# Patient Record
Sex: MALE | Race: WHITE | NOT HISPANIC OR LATINO | URBAN - METROPOLITAN AREA
[De-identification: names, ages, dates, MRNs, and addresses within clinical notes are randomized per-mention and may not be internally consistent; named-entity substitution may affect disease eponyms.]

---

## 2020-12-22 ENCOUNTER — OFFICE VISIT (OUTPATIENT)
Dept: ENDOCRINOLOGY | Facility: CLINIC | Age: 20
End: 2020-12-22

## 2020-12-22 VITALS
RESPIRATION RATE: 15 BRPM | WEIGHT: 189 LBS | BODY MASS INDEX: 27.06 KG/M2 | HEART RATE: 90 BPM | OXYGEN SATURATION: 99 % | HEIGHT: 70 IN | SYSTOLIC BLOOD PRESSURE: 110 MMHG | DIASTOLIC BLOOD PRESSURE: 70 MMHG

## 2020-12-22 DIAGNOSIS — E05.00 GRAVES' DISEASE: Primary | ICD-10-CM

## 2020-12-22 PROCEDURE — 99204 OFFICE O/P NEW MOD 45 MIN: CPT | Performed by: INTERNAL MEDICINE

## 2020-12-22 RX ORDER — MELOXICAM 15 MG/1
15 TABLET ORAL 2 TIMES DAILY
COMMUNITY
End: 2021-04-01

## 2020-12-22 RX ORDER — METHIMAZOLE 10 MG/1
20 TABLET ORAL DAILY
Qty: 60 TABLET | Refills: 11 | Status: SHIPPED | OUTPATIENT
Start: 2020-12-22 | End: 2021-04-01

## 2020-12-22 RX ORDER — PROPYLTHIOURACIL 50 MG/1
TABLET ORAL 3 TIMES DAILY
COMMUNITY
End: 2020-12-22

## 2020-12-22 RX ORDER — ATENOLOL 50 MG/1
50 TABLET ORAL DAILY
Qty: 30 TABLET | Refills: 11 | Status: SHIPPED | OUTPATIENT
Start: 2020-12-22 | End: 2021-12-06

## 2020-12-22 NOTE — PROGRESS NOTES
"     Office Note      Date: 2020  Patient Name: Gildardo Eid  MRN: [id number]  : 2000    Chief Complaint   Patient presents with   • Thyroid Problem       History of Present Illness:   Gildardo Eid is a 20 y.o. male who presents for Thyroid Problem  he is seen as a new pt today.  Duration- he noted onset of symptoms 2 years ago. Shakiness in hands and legs. Heat intolerance, nervousness and insomnia.  Wt loss of 40 pounds.  Severity-8/10  Modifying factors- he was put on ptu but it has not helped.  Context- review of records shows labs consistent with hyperthyroidism. He has maternal aunts with Graves' disese.    Subjective      }.    Review of Systems:   Review of Systems   Constitutional: Positive for fatigue and unexpected weight change.   HENT: Negative.    Eyes: Negative.    Respiratory: Negative.    Cardiovascular: Positive for palpitations.   Gastrointestinal: Negative.    Endocrine: Positive for heat intolerance.   Genitourinary: Negative.    Musculoskeletal: Negative.    Skin: Negative.    Neurological: Positive for tremors.   Hematological: Negative.    Psychiatric/Behavioral: Positive for agitation and sleep disturbance. The patient is nervous/anxious and is hyperactive.        The following portions of the patient's history were reviewed and updated as appropriate: allergies, current medications, past family history, past medical history, past social history, past surgical history and problem list.    Objective     Visit Vitals  /70   Pulse 90   Resp 15   Ht 177.8 cm (70\")   Wt 85.7 kg (189 lb)   SpO2 99%   BMI 27.12 kg/m²       Labs:    CBC w/DIFF  No results found for: WBC, RBC, HGB, HCT, MCV, MCH, MCHC, RDW, RDWSD, MPV, PLT, NEUTRORELPCT, LYMPHORELPCT, MONORELPCT, EOSRELPCT, BASORELPCT, AUTOIGPER, NEUTROABS, LYMPHSABS, MONOSABS, EOSABS, BASOSABS, AUTOIGNUM, NRBC    T4  No results found for: FREET4    TSH  No results found for: TSHBASE     Physical Exam:  Physical " Exam  Vitals signs reviewed.   Constitutional:       Appearance: Normal appearance. He is normal weight.   HENT:      Head: Normocephalic and atraumatic.      Mouth/Throat:      Mouth: Mucous membranes are moist.      Pharynx: Oropharynx is clear.   Eyes:      Extraocular Movements: Extraocular movements intact.      Pupils: Pupils are equal, round, and reactive to light.      Comments: No bulging    Neck:      Musculoskeletal: Normal range of motion and neck supple.      Comments: Diffuse, non-tender goiter  Cardiovascular:      Rate and Rhythm: Normal rate and regular rhythm.      Pulses: Normal pulses.      Heart sounds: Normal heart sounds.   Musculoskeletal: Normal range of motion.         General: No swelling.   Skin:     General: Skin is warm and dry.      Coloration: Skin is not jaundiced.      Findings: No bruising or lesion.   Neurological:      General: No focal deficit present.      Mental Status: He is alert and oriented to person, place, and time.      Deep Tendon Reflexes: Reflexes abnormal.   Psychiatric:         Mood and Affect: Mood normal.         Thought Content: Thought content normal.         Judgment: Judgment normal.         Assessment / Plan      Assessment & Plan:  Problem List Items Addressed This Visit        Endocrine    Graves' disease - Primary    Current Assessment & Plan     A new problem requiring treatment.  Will switch to tapazole for increased effectiveness and add a betablocker  Side effects and expected course of treatment were discussed.  This is not a toxic multinodular goiter based upon physical exam and not thyroiditis due to time course            Relevant Medications    meloxicam (MOBIC) 15 MG tablet    methIMAzole (TAPAZOLE) 10 MG tablet    atenolol (Tenormin) 50 MG tablet           John Tirado MD   12/22/2020

## 2020-12-22 NOTE — ASSESSMENT & PLAN NOTE
A new problem requiring treatment.  Will switch to tapazole for increased effectiveness and add a betablocker  Side effects and expected course of treatment were discussed.  This is not a toxic multinodular goiter based upon physical exam and not thyroiditis due to time course

## 2021-04-01 ENCOUNTER — LAB (OUTPATIENT)
Dept: LAB | Facility: HOSPITAL | Age: 21
End: 2021-04-01

## 2021-04-01 ENCOUNTER — OFFICE VISIT (OUTPATIENT)
Dept: ENDOCRINOLOGY | Facility: CLINIC | Age: 21
End: 2021-04-01

## 2021-04-01 VITALS
HEART RATE: 60 BPM | DIASTOLIC BLOOD PRESSURE: 74 MMHG | WEIGHT: 214 LBS | OXYGEN SATURATION: 99 % | SYSTOLIC BLOOD PRESSURE: 126 MMHG | TEMPERATURE: 97.7 F | BODY MASS INDEX: 30.64 KG/M2 | HEIGHT: 70 IN

## 2021-04-01 DIAGNOSIS — E05.00 GRAVES' DISEASE: Primary | ICD-10-CM

## 2021-04-01 DIAGNOSIS — E05.00 GRAVES' DISEASE: ICD-10-CM

## 2021-04-01 LAB
T4 FREE SERPL-MCNC: 0.95 NG/DL (ref 0.93–1.7)
TSH SERPL DL<=0.05 MIU/L-ACNC: 0.01 UIU/ML (ref 0.27–4.2)

## 2021-04-01 PROCEDURE — 84439 ASSAY OF FREE THYROXINE: CPT

## 2021-04-01 PROCEDURE — 84443 ASSAY THYROID STIM HORMONE: CPT

## 2021-04-01 PROCEDURE — 99213 OFFICE O/P EST LOW 20 MIN: CPT | Performed by: INTERNAL MEDICINE

## 2021-04-01 RX ORDER — METHIMAZOLE 10 MG/1
10 TABLET ORAL DAILY
Qty: 60 TABLET | Refills: 11 | Status: SHIPPED | OUTPATIENT
Start: 2021-04-01 | End: 2021-08-26 | Stop reason: SDUPTHER

## 2021-04-01 RX ORDER — IBUPROFEN 800 MG/1
TABLET ORAL
COMMUNITY
Start: 2021-03-18

## 2021-04-01 NOTE — PROGRESS NOTES
"     Office Note      Date: 2021  Patient Name: Gildardo Eid  MRN: [id number]  : 2000    Chief Complaint   Patient presents with   • Thyroid Problem       History of Present Illness:   Gildardo Eid is a 20 y.o. male who presents for Thyroid Problem  I have him on 20 mg per day of methimazole and atenolol  He notes wt gain. The tremors and rapid heart beat and heat intolerance are gone.  He notes no fatigue. No constipation. Some insomnia.  No neck swelling.  No ocular symptoms     Subjective        Review of Systems:   Review of Systems   Constitutional: Negative.    HENT: Negative.    Eyes: Negative.    Respiratory: Negative.    All other systems reviewed and are negative.      The following portions of the patient's history were reviewed and updated as appropriate: allergies, current medications, past family history, past medical history, past social history, past surgical history and problem list.    Objective     Visit Vitals  /74 (BP Location: Left arm, Patient Position: Sitting, Cuff Size: Adult)   Pulse 60   Temp 97.7 °F (36.5 °C) (Infrared)   Ht 177.8 cm (70\")   Wt 97.1 kg (214 lb)   SpO2 99%   BMI 30.71 kg/m²           Physical Exam:  Physical Exam  Vitals reviewed.   Constitutional:       Appearance: Normal appearance. He is normal weight.   HENT:      Head: Normocephalic and atraumatic.   Eyes:      Extraocular Movements: Extraocular movements intact.   Neck:      Comments: goiter  Lymphadenopathy:      Cervical: No cervical adenopathy.   Neurological:      Mental Status: He is alert.   Psychiatric:         Mood and Affect: Mood normal.         Thought Content: Thought content normal.         Judgment: Judgment normal.         Assessment / Plan      Assessment & Plan:  Problem List Items Addressed This Visit        Other    Graves' disease - Primary    Current Assessment & Plan     Clinically much improved. And a probably a little hypothyroid. Will check labs but have him " reduce methimazole for now          Relevant Medications    atenolol (Tenormin) 50 MG tablet    ibuprofen (ADVIL,MOTRIN) 800 MG tablet    methIMAzole (TAPAZOLE) 10 MG tablet           John Tirado MD   04/01/2021

## 2021-04-01 NOTE — ASSESSMENT & PLAN NOTE
Clinically much improved. And a probably a little hypothyroid. Will check labs but have him reduce methimazole for now

## 2021-08-26 ENCOUNTER — OFFICE VISIT (OUTPATIENT)
Dept: ENDOCRINOLOGY | Facility: CLINIC | Age: 21
End: 2021-08-26

## 2021-08-26 ENCOUNTER — LAB (OUTPATIENT)
Dept: LAB | Facility: HOSPITAL | Age: 21
End: 2021-08-26

## 2021-08-26 VITALS
SYSTOLIC BLOOD PRESSURE: 116 MMHG | HEIGHT: 70 IN | BODY MASS INDEX: 31.78 KG/M2 | DIASTOLIC BLOOD PRESSURE: 70 MMHG | HEART RATE: 50 BPM | WEIGHT: 222 LBS | OXYGEN SATURATION: 100 %

## 2021-08-26 DIAGNOSIS — T63.331A BROWN RECLUSE SPIDER BITE OR STING, ACCIDENTAL OR UNINTENTIONAL, INITIAL ENCOUNTER: ICD-10-CM

## 2021-08-26 DIAGNOSIS — E05.00 GRAVES' DISEASE: ICD-10-CM

## 2021-08-26 DIAGNOSIS — E05.00 GRAVES' DISEASE: Primary | ICD-10-CM

## 2021-08-26 LAB
ALBUMIN SERPL-MCNC: 4.7 G/DL (ref 3.5–5.2)
ALBUMIN/GLOB SERPL: 1.8 G/DL
ALP SERPL-CCNC: 159 U/L (ref 39–117)
ALT SERPL W P-5'-P-CCNC: 15 U/L (ref 1–41)
ANION GAP SERPL CALCULATED.3IONS-SCNC: 8.7 MMOL/L (ref 5–15)
AST SERPL-CCNC: 13 U/L (ref 1–40)
BASOPHILS # BLD AUTO: 0.1 10*3/MM3 (ref 0–0.2)
BASOPHILS NFR BLD AUTO: 1.2 % (ref 0–1.5)
BILIRUB SERPL-MCNC: 0.4 MG/DL (ref 0–1.2)
BUN SERPL-MCNC: 14 MG/DL (ref 6–20)
BUN/CREAT SERPL: 15.6 (ref 7–25)
CALCIUM SPEC-SCNC: 9.3 MG/DL (ref 8.6–10.5)
CHLORIDE SERPL-SCNC: 104 MMOL/L (ref 98–107)
CO2 SERPL-SCNC: 28.3 MMOL/L (ref 22–29)
CREAT SERPL-MCNC: 0.9 MG/DL (ref 0.76–1.27)
DEPRECATED RDW RBC AUTO: 38.7 FL (ref 37–54)
EOSINOPHIL # BLD AUTO: 1.25 10*3/MM3 (ref 0–0.4)
EOSINOPHIL NFR BLD AUTO: 15.3 % (ref 0.3–6.2)
ERYTHROCYTE [DISTWIDTH] IN BLOOD BY AUTOMATED COUNT: 11.7 % (ref 12.3–15.4)
GFR SERPL CREATININE-BSD FRML MDRD: 105 ML/MIN/1.73
GLOBULIN UR ELPH-MCNC: 2.6 GM/DL
GLUCOSE SERPL-MCNC: 86 MG/DL (ref 65–99)
HCT VFR BLD AUTO: 43.3 % (ref 37.5–51)
HGB BLD-MCNC: 14.1 G/DL (ref 13–17.7)
IMM GRANULOCYTES # BLD AUTO: 0.03 10*3/MM3 (ref 0–0.05)
IMM GRANULOCYTES NFR BLD AUTO: 0.4 % (ref 0–0.5)
LYMPHOCYTES # BLD AUTO: 2.09 10*3/MM3 (ref 0.7–3.1)
LYMPHOCYTES NFR BLD AUTO: 25.5 % (ref 19.6–45.3)
MCH RBC QN AUTO: 29.3 PG (ref 26.6–33)
MCHC RBC AUTO-ENTMCNC: 32.6 G/DL (ref 31.5–35.7)
MCV RBC AUTO: 90 FL (ref 79–97)
MONOCYTES # BLD AUTO: 0.46 10*3/MM3 (ref 0.1–0.9)
MONOCYTES NFR BLD AUTO: 5.6 % (ref 5–12)
NEUTROPHILS NFR BLD AUTO: 4.26 10*3/MM3 (ref 1.7–7)
NEUTROPHILS NFR BLD AUTO: 52 % (ref 42.7–76)
NRBC BLD AUTO-RTO: 0 /100 WBC (ref 0–0.2)
PLATELET # BLD AUTO: 211 10*3/MM3 (ref 140–450)
PMV BLD AUTO: 11.9 FL (ref 6–12)
POTASSIUM SERPL-SCNC: 3.8 MMOL/L (ref 3.5–5.2)
PROT SERPL-MCNC: 7.3 G/DL (ref 6–8.5)
RBC # BLD AUTO: 4.81 10*6/MM3 (ref 4.14–5.8)
SODIUM SERPL-SCNC: 141 MMOL/L (ref 136–145)
T4 FREE SERPL-MCNC: 1 NG/DL (ref 0.93–1.7)
TSH SERPL DL<=0.05 MIU/L-ACNC: 1.32 UIU/ML (ref 0.27–4.2)
WBC # BLD AUTO: 8.19 10*3/MM3 (ref 3.4–10.8)

## 2021-08-26 PROCEDURE — 80053 COMPREHEN METABOLIC PANEL: CPT

## 2021-08-26 PROCEDURE — 99214 OFFICE O/P EST MOD 30 MIN: CPT | Performed by: INTERNAL MEDICINE

## 2021-08-26 PROCEDURE — 36415 COLL VENOUS BLD VENIPUNCTURE: CPT

## 2021-08-26 PROCEDURE — 84439 ASSAY OF FREE THYROXINE: CPT

## 2021-08-26 PROCEDURE — 84443 ASSAY THYROID STIM HORMONE: CPT

## 2021-08-26 PROCEDURE — 85025 COMPLETE CBC W/AUTO DIFF WBC: CPT

## 2021-08-26 RX ORDER — SULFAMETHOXAZOLE AND TRIMETHOPRIM 400; 80 MG/1; MG/1
1 TABLET ORAL 2 TIMES DAILY
Qty: 20 TABLET | Refills: 0 | Status: SHIPPED | OUTPATIENT
Start: 2021-08-26 | End: 2023-01-18

## 2021-08-26 RX ORDER — METHIMAZOLE 10 MG/1
10 TABLET ORAL DAILY
Qty: 60 TABLET | Refills: 11 | Status: SHIPPED | OUTPATIENT
Start: 2021-08-26 | End: 2021-12-23

## 2021-08-26 RX ORDER — TERBINAFINE HYDROCHLORIDE 1 G/100G
CREAM TOPICAL 2 TIMES DAILY
COMMUNITY
Start: 2021-06-01

## 2021-08-26 NOTE — PROGRESS NOTES
"     Office Note      Date: 2021  Patient Name: Gildardo Eid  MRN: [id number]  : 2000    Cc: graves disease   History of Present Illness:   Gildardo Eid is a 21 y.o. male who presents for follow up of Graves' disease. He takes methimazole 10 mg per day.  He is up 8 pounds.  ---  He had to go to the ER for a spider bit. He was treated with abx for a spider bite.  (bactrim) he got another spider bite on the other foot yesterday.  ---  He notes no changes to his vision  He has gained wt.  No temp intol  No insomnia.  No skin or hair changed.  No neck changes.   No heart palpitations   Not unusual fatigue       Subjective        Review of Systems:   Review of Systems   Constitutional: Positive for unexpected weight change. Negative for fatigue.   HENT: Negative for trouble swallowing and voice change.    Eyes: Negative for visual disturbance.   Skin: Positive for wound.   Neurological: Negative for tremors.   Psychiatric/Behavioral: Negative for sleep disturbance.       The following portions of the patient's history were reviewed and updated as appropriate: allergies, current medications, past family history, past medical history, past social history, past surgical history and problem list.    Objective     Visit Vitals  /70 (BP Location: Left arm, Patient Position: Sitting, Cuff Size: Adult)   Pulse 50   Ht 177.8 cm (70\")   Wt 101 kg (222 lb)   SpO2 100%   BMI 31.85 kg/m²       Labs:    CBC w/DIFF  No results found for: WBC, RBC, HGB, HCT, MCV, MCH, MCHC, RDW, RDWSD, MPV, PLT, NEUTRORELPCT, LYMPHORELPCT, MONORELPCT, EOSRELPCT, BASORELPCT, AUTOIGPER, NEUTROABS, LYMPHSABS, MONOSABS, EOSABS, BASOSABS, AUTOIGNUM, NRBC    T4  Free T4   Date Value Ref Range Status   2021 0.95 0.93 - 1.70 ng/dL Final       TSH  No results found for: TSHBASE     Physical Exam:  Physical Exam  Vitals reviewed.   Constitutional:       Appearance: Normal appearance.   Eyes:      Extraocular Movements: " Extraocular movements intact.      Pupils: Pupils are equal, round, and reactive to light.   Neck:      Comments: Thyroid remains slightly larger than a normal gland   Cardiovascular:      Rate and Rhythm: Normal rate and regular rhythm.   Lymphadenopathy:      Cervical: No cervical adenopathy.   Skin:     Comments: Spider bite right foot    Neurological:      Mental Status: He is alert.   Psychiatric:         Mood and Affect: Mood normal.         Thought Content: Thought content normal.         Judgment: Judgment normal.         Assessment / Plan      Assessment & Plan:  Problem List Items Addressed This Visit        Other    Graves' disease - Primary    Current Assessment & Plan     Clinically euthyroid. Thyroid levels ordered. Medication to be adjusted accordingly.         Relevant Medications    atenolol (Tenormin) 50 MG tablet    ibuprofen (ADVIL,MOTRIN) 800 MG tablet    methIMAzole (TAPAZOLE) 10 MG tablet    Other Relevant Orders    Comprehensive Metabolic Panel    TSH    T4, Free    CBC & Differential    Brown recluse spider bite    Overview     Right foot          Current Assessment & Plan     Will treat with abx         Relevant Medications    sulfamethoxazole-trimethoprim (Bactrim) 400-80 MG tablet    Other Relevant Orders    CBC & Differential           John Tirado MD   08/26/2021

## 2021-12-05 DIAGNOSIS — E05.00 GRAVES' DISEASE: ICD-10-CM

## 2021-12-06 RX ORDER — ATENOLOL 50 MG/1
50 TABLET ORAL DAILY
Qty: 30 TABLET | Refills: 2 | Status: SHIPPED | OUTPATIENT
Start: 2021-12-06 | End: 2022-03-07

## 2021-12-23 ENCOUNTER — LAB (OUTPATIENT)
Dept: LAB | Facility: HOSPITAL | Age: 21
End: 2021-12-23

## 2021-12-23 ENCOUNTER — OFFICE VISIT (OUTPATIENT)
Dept: ENDOCRINOLOGY | Facility: CLINIC | Age: 21
End: 2021-12-23

## 2021-12-23 VITALS
DIASTOLIC BLOOD PRESSURE: 72 MMHG | BODY MASS INDEX: 33.79 KG/M2 | OXYGEN SATURATION: 97 % | SYSTOLIC BLOOD PRESSURE: 118 MMHG | HEIGHT: 70 IN | HEART RATE: 62 BPM | WEIGHT: 236 LBS

## 2021-12-23 DIAGNOSIS — E05.00 GRAVES' DISEASE: ICD-10-CM

## 2021-12-23 PROBLEM — T63.331A BROWN RECLUSE SPIDER BITE: Status: RESOLVED | Noted: 2021-08-26 | Resolved: 2021-12-23

## 2021-12-23 LAB
T4 FREE SERPL-MCNC: 1.27 NG/DL (ref 0.93–1.7)
TSH SERPL DL<=0.05 MIU/L-ACNC: 1.49 UIU/ML (ref 0.27–4.2)

## 2021-12-23 PROCEDURE — 84439 ASSAY OF FREE THYROXINE: CPT

## 2021-12-23 PROCEDURE — 99213 OFFICE O/P EST LOW 20 MIN: CPT | Performed by: INTERNAL MEDICINE

## 2021-12-23 PROCEDURE — 84443 ASSAY THYROID STIM HORMONE: CPT

## 2021-12-23 RX ORDER — CETIRIZINE HYDROCHLORIDE 10 MG/1
TABLET ORAL
COMMUNITY
Start: 2021-12-17

## 2021-12-23 RX ORDER — METHIMAZOLE 10 MG/1
5 TABLET ORAL DAILY
Qty: 60 TABLET | Refills: 11 | Status: SHIPPED | OUTPATIENT
Start: 2021-12-23 | End: 2021-12-28

## 2021-12-23 NOTE — PROGRESS NOTES
"     Office Note      Date: 2021  Patient Name: Gildardo Eid  MRN: [id number]  : 2000    Chief Complaint   Patient presents with   • Graves' Disease       History of Present Illness:   Gildardo Eid is a 21 y.o. male who presents for Graves' Disease   he is on MMI at 5 mg per day  There has been no change to his medical history.  He has gained weight  No temp intoler.  No heart palpitations.  Notes dry skin.  More fatigue .   No dysphagia     Subjective          Review of Systems:   Review of Systems   Constitutional: Positive for fatigue and unexpected weight change.   Skin:        dryness       The following portions of the patient's history were reviewed and updated as appropriate: allergies, current medications, past family history, past medical history, past social history, past surgical history and problem list.    Objective     Visit Vitals  /72 (BP Location: Left arm, Patient Position: Sitting, Cuff Size: Adult)   Pulse 62   Ht 177.8 cm (70\")   Wt 107 kg (236 lb)   SpO2 97%   BMI 33.86 kg/m²       Labs:    CBC w/DIFF  Lab Results   Component Value Date    WBC 8.19 2021    RBC 4.81 2021    HGB 14.1 2021    HCT 43.3 2021    MCV 90.0 2021    MCH 29.3 2021    MCHC 32.6 2021    RDW 11.7 (L) 2021    RDWSD 38.7 2021    MPV 11.9 2021     2021    NEUTRORELPCT 52.0 2021    LYMPHORELPCT 25.5 2021    MONORELPCT 5.6 2021    EOSRELPCT 15.3 (H) 2021    BASORELPCT 1.2 2021    AUTOIGPER 0.4 2021    NEUTROABS 4.26 2021    LYMPHSABS 2.09 2021    MONOSABS 0.46 2021    EOSABS 1.25 (H) 2021    BASOSABS 0.10 2021    AUTOIGNUM 0.03 2021    NRBC 0.0 2021       T4  Free T4   Date Value Ref Range Status   2021 1.00 0.93 - 1.70 ng/dL Final       TSH  No results found for: TSHBASE     Physical Exam:  Physical Exam  Vitals reviewed.   Constitutional:       " Appearance: Normal appearance.   Eyes:      Extraocular Movements: Extraocular movements intact.   Neck:      Comments: Thyroid is diffusely enlarged but smaller than it had been   Lymphadenopathy:      Cervical: No cervical adenopathy.   Neurological:      Mental Status: He is alert.   Psychiatric:         Mood and Affect: Mood normal.         Thought Content: Thought content normal.         Judgment: Judgment normal.         Assessment / Plan      Assessment & Plan:  Problem List Items Addressed This Visit        Other    Graves' disease    Current Assessment & Plan     Clinically improved. Will check labs and my hope is to stop his meds          Relevant Medications    ibuprofen (ADVIL,MOTRIN) 800 MG tablet    atenolol (TENORMIN) 50 MG tablet    methIMAzole (TAPAZOLE) 10 MG tablet    Other Relevant Orders    T4, Free    TSH           John Tirado MD   12/23/2021

## 2022-03-07 DIAGNOSIS — E05.00 GRAVES' DISEASE: ICD-10-CM

## 2022-03-07 RX ORDER — ATENOLOL 50 MG/1
TABLET ORAL
Qty: 90 TABLET | Refills: 1 | Status: SHIPPED | OUTPATIENT
Start: 2022-03-07 | End: 2022-11-04

## 2022-09-15 ENCOUNTER — OFFICE VISIT (OUTPATIENT)
Dept: ENDOCRINOLOGY | Facility: CLINIC | Age: 22
End: 2022-09-15

## 2022-09-15 VITALS
OXYGEN SATURATION: 98 % | BODY MASS INDEX: 31.7 KG/M2 | WEIGHT: 214 LBS | HEIGHT: 69 IN | DIASTOLIC BLOOD PRESSURE: 72 MMHG | SYSTOLIC BLOOD PRESSURE: 122 MMHG | HEART RATE: 66 BPM

## 2022-09-15 DIAGNOSIS — E05.00 GRAVES' DISEASE: Primary | ICD-10-CM

## 2022-09-15 PROCEDURE — 99213 OFFICE O/P EST LOW 20 MIN: CPT | Performed by: INTERNAL MEDICINE

## 2022-09-15 RX ORDER — MELOXICAM 7.5 MG/1
7.5 TABLET ORAL DAILY
COMMUNITY
Start: 2022-09-01

## 2022-09-15 RX ORDER — SERTRALINE HYDROCHLORIDE 100 MG/1
100 TABLET, FILM COATED ORAL DAILY
COMMUNITY
Start: 2022-08-16

## 2022-09-15 RX ORDER — METHIMAZOLE 5 MG/1
5 TABLET ORAL DAILY
Qty: 90 TABLET | Refills: 3 | Status: SHIPPED | OUTPATIENT
Start: 2022-09-15 | End: 2023-01-19

## 2022-09-15 NOTE — PROGRESS NOTES
"     Office Note      Date: 09/15/2022  Patient Name: Gildardo Eid  MRN: [id number]  : 2000    Chief Complaint   Patient presents with   • Graves' Disease       History of Present Illness:   Gildardo Eid is a 22 y.o. male who presents for Graves' Disease  he was on methimazole when I saw him in December of last year and his thyroid labs were normal so I had him stop taking methimazole.  He missed his follow up with me . But he saw his pcp who did thyroid labs and told him they were all out of whack and to get back to see me.  He has lost weight.  They have noted some tremor. But not like it had been.   No temperature intolerance  No skin or hair changes.  No neck swelling. No  Trouble swallowing.  No heart palps     We called for his labs and they confirmed hyperthyroidism     Subjective          Review of Systems:   Review of Systems   Constitutional: Positive for unexpected weight change. Negative for fatigue.   HENT: Negative for trouble swallowing.    Eyes: Negative for visual disturbance.   Cardiovascular: Negative for palpitations.   Endocrine: Negative for cold intolerance and heat intolerance.   Neurological: Positive for tremors.   Psychiatric/Behavioral: Negative for sleep disturbance.       The following portions of the patient's history were reviewed and updated as appropriate: allergies, current medications, past family history, past medical history, past social history, past surgical history and problem list.    Objective     Visit Vitals  /72   Pulse 66   Ht 175.3 cm (69\")   Wt 97.1 kg (214 lb)   SpO2 98%   BMI 31.60 kg/m²       Labs:    CBC w/DIFF  Lab Results   Component Value Date    WBC 8.19 2021    RBC 4.81 2021    HGB 14.1 2021    HCT 43.3 2021    MCV 90.0 2021    MCH 29.3 2021    MCHC 32.6 2021    RDW 11.7 (L) 2021    RDWSD 38.7 2021    MPV 11.9 2021     2021    NEUTRORELPCT 52.0 2021    " LYMPHORELPCT 25.5 08/26/2021    MONORELPCT 5.6 08/26/2021    EOSRELPCT 15.3 (H) 08/26/2021    BASORELPCT 1.2 08/26/2021    AUTOIGPER 0.4 08/26/2021    NEUTROABS 4.26 08/26/2021    LYMPHSABS 2.09 08/26/2021    MONOSABS 0.46 08/26/2021    EOSABS 1.25 (H) 08/26/2021    BASOSABS 0.10 08/26/2021    AUTOIGNUM 0.03 08/26/2021    NRBC 0.0 08/26/2021       T4  Free T4   Date Value Ref Range Status   12/23/2021 1.27 0.93 - 1.70 ng/dL Final       TSH  No results found for: TSHBASE     Physical Exam:  Physical Exam  Vitals reviewed.   Constitutional:       Appearance: Normal appearance. He is normal weight.   HENT:      Head: Normocephalic and atraumatic.   Eyes:      Extraocular Movements: Extraocular movements intact.   Neck:      Comments: Large goiter   Cardiovascular:      Rate and Rhythm: Normal rate.   Musculoskeletal:      Comments: tremor   Lymphadenopathy:      Cervical: No cervical adenopathy.   Neurological:      Mental Status: He is alert.   Psychiatric:         Mood and Affect: Mood normal.         Thought Content: Thought content normal.         Judgment: Judgment normal.         Assessment / Plan      Assessment & Plan:  Problem List Items Addressed This Visit        Other    Graves' disease - Primary    Current Assessment & Plan     Recurrent/ deteriorated.. methimazole controlled this but did not put him in remission. Needs to be back on low dose for longer term          Relevant Medications    ibuprofen (ADVIL,MOTRIN) 800 MG tablet    atenolol (TENORMIN) 50 MG tablet    meloxicam (MOBIC) 7.5 MG tablet           John Tirado MD   09/15/2022

## 2022-09-15 NOTE — ASSESSMENT & PLAN NOTE
Recurrent/ deteriorated.. methimazole controlled this but did not put him in remission. Needs to be back on low dose for longer term

## 2022-11-04 DIAGNOSIS — E05.00 GRAVES' DISEASE: ICD-10-CM

## 2022-11-04 RX ORDER — ATENOLOL 50 MG/1
TABLET ORAL
Qty: 90 TABLET | Refills: 1 | Status: SHIPPED | OUTPATIENT
Start: 2022-11-04

## 2023-01-18 ENCOUNTER — OFFICE VISIT (OUTPATIENT)
Dept: ENDOCRINOLOGY | Facility: CLINIC | Age: 23
End: 2023-01-18
Payer: COMMERCIAL

## 2023-01-18 VITALS
HEART RATE: 57 BPM | SYSTOLIC BLOOD PRESSURE: 112 MMHG | WEIGHT: 224.6 LBS | HEIGHT: 69 IN | OXYGEN SATURATION: 96 % | DIASTOLIC BLOOD PRESSURE: 82 MMHG | BODY MASS INDEX: 33.27 KG/M2

## 2023-01-18 DIAGNOSIS — E05.00 GRAVES' DISEASE: Primary | ICD-10-CM

## 2023-01-18 LAB
T4 FREE SERPL-MCNC: 2.06 NG/DL (ref 0.93–1.7)
TSH SERPL DL<=0.05 MIU/L-ACNC: 0.01 UIU/ML (ref 0.27–4.2)

## 2023-01-18 PROCEDURE — 84443 ASSAY THYROID STIM HORMONE: CPT | Performed by: INTERNAL MEDICINE

## 2023-01-18 PROCEDURE — 99213 OFFICE O/P EST LOW 20 MIN: CPT | Performed by: INTERNAL MEDICINE

## 2023-01-18 PROCEDURE — 84439 ASSAY OF FREE THYROXINE: CPT | Performed by: INTERNAL MEDICINE

## 2023-01-18 NOTE — PROGRESS NOTES
"     Office Note      Date: 2023  Patient Name: Gildardo Eid  MRN: [id number]  : 2000    Chief Complaint   Patient presents with   • Graves' Disease       History of Present Illness:   Gildardo Eid is a 22 y.o. male who presents for Graves' Disease  .   Current rx: methimazole 5 mg per day     Changes in history: none   Questions /problems: nothing has gotten worse.   he does not shake as much. He has gained back the weight he had lost.   No temperature intolerance.  No hair loss.   Not more irritable than usual  No trouble swallowing     Subjective          Review of Systems:   Review of Systems   Constitutional: Positive for unexpected weight change.   Cardiovascular: Negative for palpitations.   Psychiatric/Behavioral: Positive for sleep disturbance. Negative for agitation.       The following portions of the patient's history were reviewed and updated as appropriate: allergies, current medications, past family history, past medical history, past social history, past surgical history and problem list.    Objective     Visit Vitals  /82   Pulse 57   Ht 175.3 cm (69\")   Wt 102 kg (224 lb 9.6 oz)   SpO2 96%   BMI 33.17 kg/m²           Physical Exam:  Physical Exam  Vitals reviewed.   Constitutional:       Appearance: Normal appearance.   HENT:      Head: Normocephalic and atraumatic.   Eyes:      Extraocular Movements: Extraocular movements intact.   Neck:      Comments: Diffuse goiter  Lymphadenopathy:      Cervical: No cervical adenopathy.   Neurological:      Mental Status: He is alert.   Psychiatric:         Mood and Affect: Mood normal.         Thought Content: Thought content normal.         Judgment: Judgment normal.         Assessment / Plan      Assessment & Plan:  Problem List Items Addressed This Visit        Other    Graves' disease - Primary    Current Assessment & Plan     Improved.  Clinically euthyroid. Thyroid levels ordered. Medication to be adjusted accordingly.         " Relevant Medications    ibuprofen (ADVIL,MOTRIN) 800 MG tablet    meloxicam (MOBIC) 7.5 MG tablet    methIMAzole (TAPAZOLE) 5 MG tablet    atenolol (TENORMIN) 50 MG tablet    Other Relevant Orders    TSH    T4, Free        John Tirado MD   01/18/2023

## 2023-01-19 RX ORDER — METHIMAZOLE 10 MG/1
10 TABLET ORAL DAILY
Qty: 90 TABLET | Refills: 3 | Status: SHIPPED | OUTPATIENT
Start: 2023-01-19 | End: 2024-01-19

## 2023-03-30 ENCOUNTER — TELEPHONE (OUTPATIENT)
Dept: ENDOCRINOLOGY | Facility: CLINIC | Age: 23
End: 2023-03-30
Payer: COMMERCIAL

## 2023-03-30 NOTE — TELEPHONE ENCOUNTER
PATIENT'S SPOUSE CALLED ON BEHALF OF PATIENT TO REPORT THAT PATIENT HAS BEEN EXPERIENCING RED, IRRITATED EYES RECENTLY. PATIENT HAS BEEN UNABLE TO WEAR CONTACTS. PATIENT IS CONCERNED THAT HE MAY BE EXPERIENCING THYROID EYE DISEASE AS HE DOES HAVE GRAVE'S DISEASE. PATIENT WOULD LIKE TO KNOW WHAT STEPS HE SHOULD TAKE TO HAVE THIS ISSUE ADDRESSED.     CALL BACK [phone]

## 2023-05-25 ENCOUNTER — OFFICE VISIT (OUTPATIENT)
Dept: ENDOCRINOLOGY | Facility: CLINIC | Age: 23
End: 2023-05-25
Payer: COMMERCIAL

## 2023-05-25 VITALS
BODY MASS INDEX: 34.36 KG/M2 | HEIGHT: 69 IN | SYSTOLIC BLOOD PRESSURE: 114 MMHG | OXYGEN SATURATION: 98 % | WEIGHT: 232 LBS | DIASTOLIC BLOOD PRESSURE: 78 MMHG | HEART RATE: 74 BPM

## 2023-05-25 DIAGNOSIS — E05.00 GRAVES' DISEASE: Primary | ICD-10-CM

## 2023-05-25 LAB
ALBUMIN SERPL-MCNC: 4.7 G/DL (ref 3.5–5.2)
ALBUMIN/GLOB SERPL: 1.8 G/DL
ALP SERPL-CCNC: 135 U/L (ref 39–117)
ALT SERPL W P-5'-P-CCNC: 19 U/L (ref 1–41)
ANION GAP SERPL CALCULATED.3IONS-SCNC: 8.3 MMOL/L (ref 5–15)
AST SERPL-CCNC: 18 U/L (ref 1–40)
BILIRUB SERPL-MCNC: 0.3 MG/DL (ref 0–1.2)
BUN SERPL-MCNC: 17 MG/DL (ref 6–20)
BUN/CREAT SERPL: 21.5 (ref 7–25)
CALCIUM SPEC-SCNC: 10.1 MG/DL (ref 8.6–10.5)
CHLORIDE SERPL-SCNC: 105 MMOL/L (ref 98–107)
CO2 SERPL-SCNC: 26.7 MMOL/L (ref 22–29)
CREAT SERPL-MCNC: 0.79 MG/DL (ref 0.76–1.27)
DEPRECATED RDW RBC AUTO: 36.2 FL (ref 37–54)
EGFRCR SERPLBLD CKD-EPI 2021: 127.2 ML/MIN/1.73
ERYTHROCYTE [DISTWIDTH] IN BLOOD BY AUTOMATED COUNT: 11.8 % (ref 12.3–15.4)
GLOBULIN UR ELPH-MCNC: 2.6 GM/DL
GLUCOSE SERPL-MCNC: 82 MG/DL (ref 65–99)
HCT VFR BLD AUTO: 40.9 % (ref 37.5–51)
HGB BLD-MCNC: 14 G/DL (ref 13–17.7)
MCH RBC QN AUTO: 28.8 PG (ref 26.6–33)
MCHC RBC AUTO-ENTMCNC: 34.2 G/DL (ref 31.5–35.7)
MCV RBC AUTO: 84.2 FL (ref 79–97)
PLATELET # BLD AUTO: 268 10*3/MM3 (ref 140–450)
PMV BLD AUTO: 11.8 FL (ref 6–12)
POTASSIUM SERPL-SCNC: 4.4 MMOL/L (ref 3.5–5.2)
PROT SERPL-MCNC: 7.3 G/DL (ref 6–8.5)
RBC # BLD AUTO: 4.86 10*6/MM3 (ref 4.14–5.8)
SODIUM SERPL-SCNC: 140 MMOL/L (ref 136–145)
WBC NRBC COR # BLD: 6.91 10*3/MM3 (ref 3.4–10.8)

## 2023-05-25 PROCEDURE — 80050 GENERAL HEALTH PANEL: CPT | Performed by: INTERNAL MEDICINE

## 2023-05-25 PROCEDURE — 84439 ASSAY OF FREE THYROXINE: CPT | Performed by: INTERNAL MEDICINE

## 2023-05-25 NOTE — PROGRESS NOTES
"     Office Note      Date: 2023  Patient Name: Gildardo Eid  MRN: [id number]  : 2000    Chief Complaint   Patient presents with   • Graves' Disease       History of Present Illness:   Gildardo Eid is a 22 y.o. male who presents for Graves' Disease  .   Current rx: mmi - 10 mg per day     Changes in history: none   Questions /problems: has pain in the calves.  Has been going on for quite  Some time. mobic does not seem to help     Subjective          Review of Systems:   Review of Systems   Constitutional: Positive for unexpected weight change. Negative for fatigue.   HENT: Negative for trouble swallowing and voice change.    Eyes: Negative for visual disturbance.   Endocrine: Negative for cold intolerance and heat intolerance.   Musculoskeletal: Positive for myalgias.   Neurological: Positive for tremors.   Psychiatric/Behavioral: Negative for agitation and sleep disturbance. The patient is nervous/anxious.        The following portions of the patient's history were reviewed and updated as appropriate: allergies, current medications, past family history, past medical history, past social history, past surgical history and problem list.    Objective     Visit Vitals  /78   Pulse 74   Ht 175.3 cm (69\")   Wt 105 kg (232 lb)   SpO2 98%   BMI 34.26 kg/m²           Physical Exam:  Physical Exam  Vitals reviewed.   Constitutional:       General: He is not in acute distress.     Appearance: Normal appearance. He is not ill-appearing, toxic-appearing or diaphoretic.   Eyes:      Extraocular Movements: Extraocular movements intact.   Neck:      Comments: Thyroid feels normal on exam   Musculoskeletal:      Comments: No tremor   Lymphadenopathy:      Cervical: No cervical adenopathy.   Neurological:      Mental Status: He is alert.   Psychiatric:         Judgment: Judgment normal.         Assessment / Plan      Assessment & Plan:  Problem List Items Addressed This Visit        Other    Graves' " disease - Primary    Current Assessment & Plan     Stable.  Clinically euthyroid. Thyroid levels ordered. Medication to be adjusted accordingly.  Don't think the leg pain is thyroid related. Maybe from the medication so will know from labs          Relevant Medications    ibuprofen (ADVIL,MOTRIN) 800 MG tablet    meloxicam (MOBIC) 7.5 MG tablet    atenolol (TENORMIN) 50 MG tablet    methIMAzole (TAPAZOLE) 10 MG tablet    Other Relevant Orders    Comprehensive Metabolic Panel    CBC (No Diff)    T4, Free    TSH        John Tirado MD   05/25/2023

## 2023-05-25 NOTE — ASSESSMENT & PLAN NOTE
Stable.  Clinically euthyroid. Thyroid levels ordered. Medication to be adjusted accordingly.  Don't think the leg pain is thyroid related. Maybe from the medication so will know from labs

## 2023-05-26 LAB
T4 FREE SERPL-MCNC: 1.52 NG/DL (ref 0.93–1.7)
TSH SERPL DL<=0.05 MIU/L-ACNC: <0.005 UIU/ML (ref 0.27–4.2)

## 2023-06-01 DIAGNOSIS — E05.00 GRAVES' DISEASE: ICD-10-CM

## 2023-06-01 RX ORDER — ATENOLOL 50 MG/1
TABLET ORAL
Qty: 90 TABLET | Refills: 1 | Status: SHIPPED | OUTPATIENT
Start: 2023-06-01

## 2023-06-01 NOTE — TELEPHONE ENCOUNTER
Rx Refill Note  Requested Prescriptions     Pending Prescriptions Disp Refills   • atenolol (TENORMIN) 50 MG tablet [Pharmacy Med Name: atenolol 50 mg tablet] 90 tablet 1     Sig: TAKE ONE TABLET BY MOUTH EVERY DAY          Last office visit with prescribing clinician: 5/25/2023     Next office visit with prescribing clinician: 11/28/2023         Evelin Chow MA  06/01/23, 09:53 EDT

## 2023-11-28 ENCOUNTER — OFFICE VISIT (OUTPATIENT)
Dept: ENDOCRINOLOGY | Facility: CLINIC | Age: 23
End: 2023-11-28
Payer: COMMERCIAL

## 2023-11-28 VITALS
SYSTOLIC BLOOD PRESSURE: 114 MMHG | DIASTOLIC BLOOD PRESSURE: 80 MMHG | HEIGHT: 69 IN | WEIGHT: 240 LBS | BODY MASS INDEX: 35.55 KG/M2 | OXYGEN SATURATION: 98 % | HEART RATE: 63 BPM

## 2023-11-28 DIAGNOSIS — E05.00 GRAVES' DISEASE: Primary | ICD-10-CM

## 2023-11-28 LAB
DEPRECATED RDW RBC AUTO: 34.7 FL (ref 37–54)
ERYTHROCYTE [DISTWIDTH] IN BLOOD BY AUTOMATED COUNT: 11.5 % (ref 12.3–15.4)
HCT VFR BLD AUTO: 44.3 % (ref 37.5–51)
HGB BLD-MCNC: 14.6 G/DL (ref 13–17.7)
MCH RBC QN AUTO: 27.9 PG (ref 26.6–33)
MCHC RBC AUTO-ENTMCNC: 33 G/DL (ref 31.5–35.7)
MCV RBC AUTO: 84.7 FL (ref 79–97)
PLATELET # BLD AUTO: 286 10*3/MM3 (ref 140–450)
PMV BLD AUTO: 11.5 FL (ref 6–12)
RBC # BLD AUTO: 5.23 10*6/MM3 (ref 4.14–5.8)
WBC NRBC COR # BLD AUTO: 6.36 10*3/MM3 (ref 3.4–10.8)

## 2023-11-28 PROCEDURE — 80050 GENERAL HEALTH PANEL: CPT | Performed by: INTERNAL MEDICINE

## 2023-11-28 PROCEDURE — 84439 ASSAY OF FREE THYROXINE: CPT | Performed by: INTERNAL MEDICINE

## 2023-11-28 RX ORDER — ROPINIROLE 0.5 MG/1
0.25 TABLET, FILM COATED ORAL DAILY
COMMUNITY
Start: 2023-11-20

## 2023-11-28 NOTE — PROGRESS NOTES
"     Office Note      Date: 2023  Patient Name: Gildardo Eid  MRN: [id number]  : 2000    Chief Complaint   Patient presents with    Thyroid Problem     Graves' disease         History of Present Illness:   Gildardo Eid is a 23 y.o. male who presents for Thyroid Problem (Graves' disease/)  .   Current rx: methimazole 10 mg     Changes in history: none   Questions /problems:none     Subjective          Review of Systems:   Review of Systems   Constitutional:  Positive for unexpected weight change.   HENT:  Negative for trouble swallowing and voice change.    Eyes:  Negative for visual disturbance.   Cardiovascular:  Negative for palpitations.   Endocrine: Negative for cold intolerance and heat intolerance.   Neurological:  Negative for tremors.   Psychiatric/Behavioral:  Positive for sleep disturbance. Negative for agitation. The patient is not nervous/anxious.        The following portions of the patient's history were reviewed and updated as appropriate: allergies, current medications, past family history, past medical history, past social history, past surgical history, and problem list.    Objective     Visit Vitals  /80 (BP Location: Left arm, Patient Position: Sitting, Cuff Size: Adult)   Pulse 63   Ht 175.3 cm (69\")   Wt 109 kg (240 lb)   SpO2 98%   BMI 35.44 kg/m²           Physical Exam:  Physical Exam  Vitals reviewed.   Constitutional:       Appearance: Normal appearance.   Eyes:      Extraocular Movements: Extraocular movements intact.   Neck:      Comments: Diffuse goiter noted.   Pulmonary:      Effort: Pulmonary effort is normal.   Lymphadenopathy:      Cervical: No cervical adenopathy.   Neurological:      Mental Status: He is alert.   Psychiatric:         Mood and Affect: Mood normal.         Behavior: Behavior normal.         Thought Content: Thought content normal.         Judgment: Judgment normal.         Assessment / Plan      Assessment & Plan:  Problem List Items " Addressed This Visit          Other    Graves' disease - Primary    Current Assessment & Plan     Clinically improved.  Clinically euthyroid. Thyroid levels ordered. Medication to be adjusted accordingly.          Relevant Medications    ibuprofen (ADVIL,MOTRIN) 800 MG tablet    meloxicam (MOBIC) 7.5 MG tablet    methIMAzole (TAPAZOLE) 10 MG tablet    atenolol (TENORMIN) 50 MG tablet    Other Relevant Orders    TSH    T4, Free    Comprehensive Metabolic Panel    CBC (No Diff)        Electronically signed by : John Tirado MD   11/28/2023

## 2023-11-29 LAB
ALBUMIN SERPL-MCNC: 4.6 G/DL (ref 3.5–5.2)
ALBUMIN/GLOB SERPL: 1.6 G/DL
ALP SERPL-CCNC: 104 U/L (ref 39–117)
ALT SERPL W P-5'-P-CCNC: 39 U/L (ref 1–41)
ANION GAP SERPL CALCULATED.3IONS-SCNC: 11.8 MMOL/L (ref 5–15)
AST SERPL-CCNC: 27 U/L (ref 1–40)
BILIRUB SERPL-MCNC: 0.4 MG/DL (ref 0–1.2)
BUN SERPL-MCNC: 18 MG/DL (ref 6–20)
BUN/CREAT SERPL: 21.4 (ref 7–25)
CALCIUM SPEC-SCNC: 10 MG/DL (ref 8.6–10.5)
CHLORIDE SERPL-SCNC: 101 MMOL/L (ref 98–107)
CO2 SERPL-SCNC: 24.2 MMOL/L (ref 22–29)
CREAT SERPL-MCNC: 0.84 MG/DL (ref 0.76–1.27)
EGFRCR SERPLBLD CKD-EPI 2021: 124.1 ML/MIN/1.73
GLOBULIN UR ELPH-MCNC: 2.8 GM/DL
GLUCOSE SERPL-MCNC: 100 MG/DL (ref 65–99)
POTASSIUM SERPL-SCNC: 4.2 MMOL/L (ref 3.5–5.2)
PROT SERPL-MCNC: 7.4 G/DL (ref 6–8.5)
SODIUM SERPL-SCNC: 137 MMOL/L (ref 136–145)
T4 FREE SERPL-MCNC: 1.89 NG/DL (ref 0.93–1.7)
TSH SERPL DL<=0.05 MIU/L-ACNC: <0.005 UIU/ML (ref 0.27–4.2)

## 2023-11-30 RX ORDER — METHIMAZOLE 10 MG/1
15 TABLET ORAL DAILY
Qty: 135 TABLET | Refills: 3 | Status: SHIPPED | OUTPATIENT
Start: 2023-11-30 | End: 2023-12-01 | Stop reason: SDUPTHER

## 2023-12-01 DIAGNOSIS — E05.00 GRAVES' DISEASE: ICD-10-CM

## 2023-12-01 RX ORDER — ATENOLOL 50 MG/1
TABLET ORAL
Qty: 90 TABLET | Refills: 1 | Status: SHIPPED | OUTPATIENT
Start: 2023-12-01

## 2023-12-01 RX ORDER — METHIMAZOLE 10 MG/1
15 TABLET ORAL DAILY
Qty: 135 TABLET | Refills: 3 | Status: SHIPPED | OUTPATIENT
Start: 2023-12-01 | End: 2024-11-30

## 2023-12-01 NOTE — TELEPHONE ENCOUNTER
Rx Refill Note  Requested Prescriptions     Pending Prescriptions Disp Refills    atenolol (TENORMIN) 50 MG tablet [Pharmacy Med Name: atenolol 50 mg tablet] 90 tablet 1     Sig: TAKE ONE TABLET BY MOUTH EVERY DAY    methIMAzole (TAPAZOLE) 10 MG tablet 135 tablet 3     Sig: Take 1.5 tablets by mouth Daily.          Last office visit with prescribing clinician: 11/28/2023     Next office visit with prescribing clinician: 5/22/2024         Evelin Chow MA  12/01/23, 11:03 EST

## 2024-05-24 ENCOUNTER — OFFICE VISIT (OUTPATIENT)
Dept: ENDOCRINOLOGY | Facility: CLINIC | Age: 24
End: 2024-05-24
Payer: COMMERCIAL

## 2024-05-24 VITALS
HEART RATE: 51 BPM | WEIGHT: 236 LBS | DIASTOLIC BLOOD PRESSURE: 64 MMHG | SYSTOLIC BLOOD PRESSURE: 120 MMHG | OXYGEN SATURATION: 100 % | HEIGHT: 69 IN | BODY MASS INDEX: 34.96 KG/M2

## 2024-05-24 DIAGNOSIS — E05.00 GRAVES' DISEASE: Primary | ICD-10-CM

## 2024-05-24 DIAGNOSIS — R00.1 BRADYCARDIA: ICD-10-CM

## 2024-05-24 LAB
ALBUMIN SERPL-MCNC: 4.6 G/DL (ref 3.5–5.2)
ALBUMIN/GLOB SERPL: 1.8 G/DL
ALP SERPL-CCNC: 95 U/L (ref 39–117)
ALT SERPL W P-5'-P-CCNC: 18 U/L (ref 1–41)
ANION GAP SERPL CALCULATED.3IONS-SCNC: 10 MMOL/L (ref 5–15)
AST SERPL-CCNC: 15 U/L (ref 1–40)
BILIRUB SERPL-MCNC: 0.2 MG/DL (ref 0–1.2)
BUN SERPL-MCNC: 14 MG/DL (ref 6–20)
BUN/CREAT SERPL: 18.4 (ref 7–25)
CALCIUM SPEC-SCNC: 9.1 MG/DL (ref 8.6–10.5)
CHLORIDE SERPL-SCNC: 107 MMOL/L (ref 98–107)
CO2 SERPL-SCNC: 25 MMOL/L (ref 22–29)
CREAT SERPL-MCNC: 0.76 MG/DL (ref 0.76–1.27)
DEPRECATED RDW RBC AUTO: 38.4 FL (ref 37–54)
EGFRCR SERPLBLD CKD-EPI 2021: 127.9 ML/MIN/1.73
ERYTHROCYTE [DISTWIDTH] IN BLOOD BY AUTOMATED COUNT: 11.8 % (ref 12.3–15.4)
GLOBULIN UR ELPH-MCNC: 2.6 GM/DL
GLUCOSE SERPL-MCNC: 99 MG/DL (ref 65–99)
HCT VFR BLD AUTO: 43.4 % (ref 37.5–51)
HGB BLD-MCNC: 13.9 G/DL (ref 13–17.7)
MCH RBC QN AUTO: 28.3 PG (ref 26.6–33)
MCHC RBC AUTO-ENTMCNC: 32 G/DL (ref 31.5–35.7)
MCV RBC AUTO: 88.4 FL (ref 79–97)
PLATELET # BLD AUTO: 221 10*3/MM3 (ref 140–450)
PMV BLD AUTO: 12.3 FL (ref 6–12)
POTASSIUM SERPL-SCNC: 4.3 MMOL/L (ref 3.5–5.2)
PROT SERPL-MCNC: 7.2 G/DL (ref 6–8.5)
RBC # BLD AUTO: 4.91 10*6/MM3 (ref 4.14–5.8)
SODIUM SERPL-SCNC: 142 MMOL/L (ref 136–145)
T4 FREE SERPL-MCNC: 1.26 NG/DL (ref 0.93–1.7)
TSH SERPL DL<=0.05 MIU/L-ACNC: <0.005 UIU/ML (ref 0.27–4.2)
WBC NRBC COR # BLD AUTO: 6.15 10*3/MM3 (ref 3.4–10.8)

## 2024-05-24 PROCEDURE — 80050 GENERAL HEALTH PANEL: CPT | Performed by: PHYSICIAN ASSISTANT

## 2024-05-24 PROCEDURE — 84439 ASSAY OF FREE THYROXINE: CPT | Performed by: PHYSICIAN ASSISTANT

## 2024-05-24 RX ORDER — ATENOLOL 25 MG/1
12.5 TABLET ORAL DAILY PRN
Qty: 30 TABLET | Refills: 6 | Status: SHIPPED | OUTPATIENT
Start: 2024-05-24

## 2024-05-24 NOTE — ASSESSMENT & PLAN NOTE
Clinically euthyroid, HR low in office today in context atenolol use.   Repeat TFT, CMP, CBC.  Discussed further evaluation with TSI - defers at this time as would not  at this time, consider if thyroid levels continue fluctuating.   Rx: continue current dose methimazole 15 mg for now. Decrease atenolol 12.5 mg as needed.  Cautioned potential side effects with medication: hepatitis, abnormal WBC, GI upset, allergy.

## 2024-05-24 NOTE — PROGRESS NOTES
Chief Complaint   Patient presents with    Thyroid Problem     Graves' disease        HPI  Gildardo Eid is a 23 y.o. male presents today for follow-up evaluation of hyperthyroidism. He was last seen for this 11/28/2023 by Dr. Tirado. He showed continued hyperthyroidism. Methimazole 10 mg increased to 15 mg 11/30/2023.     Without concerns today. Taking medication as prescribed and tolerating well. Denies changes in weight, heat intolerance, palpitations, tremor, abdominal pain, diarrhea, insomnia, anxiety, vision changes, changes in hair/skin/nails.     Hyperthyroidism:  Initially diagnosed 2020 and treated for Grave's disease given without nodule on exam and duration unlikely thyroiditis.   Labs from 5/28/2023 TSH: Undetectable FT4: 1.89    - Denies history of thyroid ultrasound, uptake scan.  - Denies history of thyroid-associated orbitopathy.   - Admits family history of thyroid disease: m aunt hyperthyroidism.  - Denies history of radiation to head/neck  - Denies taking biotin supplement.   - Denies high iodine diet.     Social History:   Home: [place]  Occupation: [employer]  Diet: meals 2-3x/day  Exercise: more active recently; working outside   Sleep: denies concerns   Tobacco use: denies     Eye exam: 2 weeks ago     Past Medical History:   Diagnosis Date    Graves disease      Past Surgical History:   Procedure Laterality Date    TONSILLECTOMY AND ADENOIDECTOMY        Family History   Problem Relation Age of Onset    No Known Problems Mother     No Known Problems Father     No Known Problems Brother       Social History     Socioeconomic History    Marital status:    Tobacco Use    Smoking status: Never    Smokeless tobacco: Never   Vaping Use    Vaping status: Never Used   Substance and Sexual Activity    Alcohol use: Never    Drug use: Never    Sexual activity: Defer      No Known Allergies   Current Outpatient Medications on File Prior to Visit   Medication Sig Dispense Refill    methIMAzole  "(TAPAZOLE) 10 MG tablet Take 1.5 tablets by mouth Daily. 135 tablet 3    ibuprofen (ADVIL,MOTRIN) 800 MG tablet       sertraline (ZOLOFT) 100 MG tablet Take 1 tablet by mouth Daily.      [DISCONTINUED] atenolol (TENORMIN) 50 MG tablet TAKE ONE TABLET BY MOUTH EVERY DAY 90 tablet 1    [DISCONTINUED] cetirizine (zyrTEC) 10 MG tablet TAKE ONE TABLET BY MOUTH EVERY DAY AS NEEDED FOR FOR ALLERGY SYMPTOMS (Patient not taking: Reported on 5/24/2024)      [DISCONTINUED] meloxicam (MOBIC) 7.5 MG tablet Take 2 tablets by mouth Daily. (Patient not taking: Reported on 5/24/2024)      [DISCONTINUED] rOPINIRole (REQUIP) 0.5 MG tablet Take 0.5 tablets by mouth Daily. (Patient not taking: Reported on 5/24/2024)      [DISCONTINUED] SM Athletes Foot 1 % cream Apply  topically to the appropriate area as directed 2 (Two) Times a Day. (Patient not taking: Reported on 5/24/2024)       No current facility-administered medications on file prior to visit.        Review of Systems   Constitutional:  Negative for activity change, appetite change, fatigue, unexpected weight gain and unexpected weight loss.   HENT:  Negative for trouble swallowing and voice change.    Respiratory:  Negative for shortness of breath.    Cardiovascular:  Negative for chest pain and palpitations.   Gastrointestinal:  Negative for constipation and diarrhea.   Endocrine: Negative for cold intolerance and heat intolerance.   Musculoskeletal:  Negative for myalgias.   Skin:  Negative for dry skin.   Neurological:  Negative for dizziness, tremors and numbness.   Psychiatric/Behavioral:  Negative for depressed mood and stress. The patient is not nervous/anxious.         /64 (BP Location: Left arm, Patient Position: Sitting, Cuff Size: Adult)   Pulse 51   Ht 175.3 cm (69\")   Wt 107 kg (236 lb)   SpO2 100%   BMI 34.85 kg/m² Body mass index is 34.85 kg/m².    Physical Exam  Constitutional:       Appearance: Normal appearance.   Neck:      Thyroid: No thyroid " mass, thyromegaly or thyroid tenderness.      Comments: Exam limited due to neck thickness   Cardiovascular:      Rate and Rhythm: Normal rate.   Pulmonary:      Effort: Pulmonary effort is normal.   Musculoskeletal:         General: Normal range of motion.   Skin:     General: Skin is warm and dry.   Neurological:      General: No focal deficit present.      Mental Status: He is alert and oriented to person, place, and time.   Psychiatric:         Mood and Affect: Mood normal.         Behavior: Behavior normal.         LABS AND IMAGING    CMP  Lab Results   Component Value Date    GLUCOSE 100 (H) 11/28/2023    BUN 18 11/28/2023    CREATININE 0.84 11/28/2023    EGFR 124.1 11/28/2023    BCR 21.4 11/28/2023    K 4.2 11/28/2023    CO2 24.2 11/28/2023    CALCIUM 10.0 11/28/2023    ALBUMIN 4.6 11/28/2023    AST 27 11/28/2023    ALT 39 11/28/2023        CBC w/DIFF   Lab Results   Component Value Date    WBC 6.36 11/28/2023    RBC 5.23 11/28/2023    HGB 14.6 11/28/2023    HCT 44.3 11/28/2023    MCV 84.7 11/28/2023    MCH 27.9 11/28/2023    MCHC 33.0 11/28/2023    RDW 11.5 (L) 11/28/2023    RDWSD 34.7 (L) 11/28/2023    MPV 11.5 11/28/2023     11/28/2023    NEUTRORELPCT 52.0 08/26/2021    LYMPHORELPCT 25.5 08/26/2021    MONORELPCT 5.6 08/26/2021    EOSRELPCT 15.3 (H) 08/26/2021    BASORELPCT 1.2 08/26/2021    AUTOIGPER 0.4 08/26/2021    NEUTROABS 4.26 08/26/2021    LYMPHSABS 2.09 08/26/2021    MONOSABS 0.46 08/26/2021    EOSABS 1.25 (H) 08/26/2021    BASOSABS 0.10 08/26/2021    AUTOIGNUM 0.03 08/26/2021    NRBC 0.0 08/26/2021       TSH  Lab Results   Component Value Date    TSH <0.005 (L) 11/28/2023    TSH <0.005 (L) 05/25/2023    TSH 0.005 (L) 01/18/2023       T4  Lab Results   Component Value Date    FREET4 1.89 (H) 11/28/2023    FREET4 1.52 05/25/2023    FREET4 2.06 (H) 01/18/2023       No valid procedures specified.    Assessment and Plan    Diagnoses and all orders for this visit:    1. Graves' disease  (Primary)  Assessment & Plan:  Clinically euthyroid, HR low in office today in context atenolol use.   Repeat TFT, CMP, CBC.  Discussed further evaluation with TSI - defers at this time as would not  at this time, consider if thyroid levels continue fluctuating.   Rx: continue current dose methimazole 15 mg for now. Decrease atenolol 12.5 mg as needed.  Cautioned potential side effects with medication: hepatitis, abnormal WBC, GI upset, allergy.     Orders:  -     atenolol (TENORMIN) 25 MG tablet; Take 0.5 tablets by mouth Daily As Needed (tremors or palpitations).  Dispense: 30 tablet; Refill: 6  -     TSH  -     T4, Free  -     Comprehensive Metabolic Panel  -     CBC (No Diff)    2. Bradycardia  Assessment & Plan:  Asymptomatic, likely due to atenolol use/improvement in thyroid levels.  Rx: decrease atenolol 25 mg x 1 week, then 1/2 tablet x 1 week, then as needed for tremors/palpitations           Return in about 4 months (around 9/24/2024) for follow-up thyroid disease. The patient was instructed to contact the clinic with any interval questions or concerns.    Annalise Ruiz PA-C   Endocrinology    Please note that portions of this note were completed with a voice recognition program.

## 2024-05-24 NOTE — ASSESSMENT & PLAN NOTE
Asymptomatic, likely due to atenolol use/improvement in thyroid levels.  Rx: decrease atenolol 25 mg x 1 week, then 1/2 tablet x 1 week, then as needed for tremors/palpitations

## 2024-06-03 ENCOUNTER — TELEPHONE (OUTPATIENT)
Dept: ENDOCRINOLOGY | Facility: CLINIC | Age: 24
End: 2024-06-03
Payer: COMMERCIAL

## 2024-06-03 DIAGNOSIS — E05.00 GRAVES' DISEASE: Primary | ICD-10-CM

## 2024-06-03 RX ORDER — METHIMAZOLE 10 MG/1
20 TABLET ORAL DAILY
Qty: 180 TABLET | Refills: 1 | Status: SHIPPED | OUTPATIENT
Start: 2024-06-03 | End: 2025-06-03

## 2024-06-03 NOTE — TELEPHONE ENCOUNTER
"Patients wife called for Gildardo. Stated he received his [employer] lab result message,     \"I would recommend increasing the dose of methimazole to 20 mg (2 tablets of methimazole 10 mg) to see if this helps with improving control and repeating labs in 4-6 weeks for monitoring with additional T3 level. Your other labs are without significant liver, kidney, electrolyte, or blood changes.  Let me know if you are ok with this adjustment and I can send in an updated prescription.\"    He is agreeable to this change and would like updated rx sent in for the new dose of methimazole. Confirmed his pharmacy. Please send in.       "

## 2024-09-30 ENCOUNTER — OFFICE VISIT (OUTPATIENT)
Age: 24
End: 2024-09-30
Payer: COMMERCIAL

## 2024-09-30 VITALS
SYSTOLIC BLOOD PRESSURE: 122 MMHG | HEART RATE: 62 BPM | HEIGHT: 69 IN | BODY MASS INDEX: 34.36 KG/M2 | DIASTOLIC BLOOD PRESSURE: 74 MMHG | OXYGEN SATURATION: 99 % | WEIGHT: 232 LBS

## 2024-09-30 DIAGNOSIS — E05.00 GRAVES' DISEASE: Primary | ICD-10-CM

## 2024-09-30 PROCEDURE — 80053 COMPREHEN METABOLIC PANEL: CPT | Performed by: INTERNAL MEDICINE

## 2024-09-30 PROCEDURE — 84439 ASSAY OF FREE THYROXINE: CPT | Performed by: INTERNAL MEDICINE

## 2024-09-30 PROCEDURE — 84443 ASSAY THYROID STIM HORMONE: CPT | Performed by: INTERNAL MEDICINE

## 2024-09-30 PROCEDURE — 99213 OFFICE O/P EST LOW 20 MIN: CPT | Performed by: INTERNAL MEDICINE

## 2024-09-30 NOTE — PROGRESS NOTES
"     Office Note      Date: 2024  Patient Name: Gildardo Eid  MRN: [id number]  : 2000    Chief Complaint   Patient presents with    Graves' Disease       History of Present Illness:   Gildardo Eid is a 24 y.o. male who presents for Graves' Disease  .   Current rx:  methimazole 20 mg per day - increase when he saw yareil luevano at his last visit     Changes in history: NONE   Questions /problems:NONE    Subjective          Review of Systems:   Review of Systems   Constitutional:  Negative for fatigue and unexpected weight change.   HENT:  Negative for trouble swallowing and voice change.    Eyes:  Negative for visual disturbance.   Respiratory:  Negative for choking.    Cardiovascular:  Negative for palpitations.   Endocrine: Negative for cold intolerance and heat intolerance.   Neurological:  Negative for tremors.   Psychiatric/Behavioral:  Negative for agitation and sleep disturbance.        The following portions of the patient's history were reviewed and updated as appropriate: allergies, current medications, past family history, past medical history, past social history, past surgical history, and problem list.    Objective     Visit Vitals  /74 (BP Location: Left arm, Patient Position: Sitting, Cuff Size: Adult)   Pulse 62   Ht 175.3 cm (69\")   Wt 105 kg (232 lb)   SpO2 99%   BMI 34.26 kg/m²           Physical Exam:  Physical Exam  Vitals reviewed.   Constitutional:       General: He is not in acute distress.     Appearance: Normal appearance. He is not ill-appearing, toxic-appearing or diaphoretic.   HENT:      Head: Normocephalic and atraumatic.   Neck:      Comments: VERY LARGE SMOOTH GOITER  Cardiovascular:      Rate and Rhythm: Normal rate.   Pulmonary:      Effort: Pulmonary effort is normal.   Lymphadenopathy:      Cervical: No cervical adenopathy.   Neurological:      Mental Status: He is alert.   Psychiatric:         Mood and Affect: Mood normal.         Thought Content: " Thought content normal.         Judgment: Judgment normal.         Assessment / Plan      Assessment & Plan:  Problem List Items Addressed This Visit       Graves' disease - Primary    Current Assessment & Plan     IMPROVED  Clinically euthyroid. Thyroid levels ordered. Medication to be adjusted accordingly.          Relevant Medications    ibuprofen (ADVIL,MOTRIN) 800 MG tablet    methIMAzole (TAPAZOLE) 10 MG tablet    Other Relevant Orders    TSH    T4, Free    Comprehensive Metabolic Panel        Electronically signed by : John Tirado MD   09/30/2024

## 2024-10-01 LAB
ALBUMIN SERPL-MCNC: 4.8 G/DL (ref 3.5–5.2)
ALBUMIN/GLOB SERPL: 1.8 G/DL
ALP SERPL-CCNC: 88 U/L (ref 39–117)
ALT SERPL W P-5'-P-CCNC: 18 U/L (ref 1–41)
ANION GAP SERPL CALCULATED.3IONS-SCNC: 11.4 MMOL/L (ref 5–15)
AST SERPL-CCNC: 16 U/L (ref 1–40)
BILIRUB SERPL-MCNC: 0.3 MG/DL (ref 0–1.2)
BUN SERPL-MCNC: 16 MG/DL (ref 6–20)
BUN/CREAT SERPL: 13.6 (ref 7–25)
CALCIUM SPEC-SCNC: 9.7 MG/DL (ref 8.6–10.5)
CHLORIDE SERPL-SCNC: 103 MMOL/L (ref 98–107)
CO2 SERPL-SCNC: 25.6 MMOL/L (ref 22–29)
CREAT SERPL-MCNC: 1.18 MG/DL (ref 0.76–1.27)
EGFRCR SERPLBLD CKD-EPI 2021: 87.3 ML/MIN/1.73
GLOBULIN UR ELPH-MCNC: 2.7 GM/DL
GLUCOSE SERPL-MCNC: 74 MG/DL (ref 65–99)
POTASSIUM SERPL-SCNC: 3.8 MMOL/L (ref 3.5–5.2)
PROT SERPL-MCNC: 7.5 G/DL (ref 6–8.5)
SODIUM SERPL-SCNC: 140 MMOL/L (ref 136–145)
T4 FREE SERPL-MCNC: 0.98 NG/DL (ref 0.92–1.68)
TSH SERPL DL<=0.05 MIU/L-ACNC: 2.59 UIU/ML (ref 0.27–4.2)

## 2024-12-18 DIAGNOSIS — E05.00 GRAVES' DISEASE: ICD-10-CM

## 2024-12-18 RX ORDER — METHIMAZOLE 10 MG/1
20 TABLET ORAL DAILY
Qty: 180 TABLET | Refills: 0 | Status: SHIPPED | OUTPATIENT
Start: 2024-12-18 | End: 2025-12-18

## 2024-12-18 NOTE — TELEPHONE ENCOUNTER
Rx Refill Note  Requested Prescriptions     Pending Prescriptions Disp Refills    methIMAzole (TAPAZOLE) 10 MG tablet 180 tablet 0     Sig: Take 2 tablets by mouth Daily.      Last office visit with prescribing clinician: 9/30/2024    Next office visit with prescribing clinician: 2/19/2025     Susan Herzog MA  12/18/24, 10:52 EST

## 2025-02-17 ENCOUNTER — TELEPHONE (OUTPATIENT)
Dept: ENDOCRINOLOGY | Facility: CLINIC | Age: 25
End: 2025-02-17
Payer: COMMERCIAL

## 2025-02-17 DIAGNOSIS — E05.00 GRAVES' DISEASE: Primary | ICD-10-CM

## 2025-02-17 NOTE — TELEPHONE ENCOUNTER
Called patient and ask him where he would like lab order sent to and he is not sure..  He will brenna us back to let us know where to send lab orders.

## 2025-02-17 NOTE — TELEPHONE ENCOUNTER
PATIENT HAD TO RESCHEDULE HIS APPOINTMENT DUE TO FLOODING ISSUES WHERE THEY LIVE. THE WIFE IS CALLING TO SEE IF DR. WICK CAN PLACE LAB ORDERS IN CHART SO HE CAN GO AHEAD AND HAVE LEVELS CHECKED AND TO SEE IF MEDICATION NEEDS TO BE ADJUSTED. PHONE NUMBER [phone]

## 2025-02-17 NOTE — TELEPHONE ENCOUNTER
Provider: DR BHARATHI WCIK     Caller: CECE VILLASENOR     Relationship to Patient: SPOUSE     Phone Number: [phone]     Reason for Call: PLEASE SEND LAB ORDERS TO [facility] PLEASE FAX ORDERS [phone] PLEASE ADVISE AND CALL SPOUSE ONCE THOSE ARE FAXED OVER

## 2025-06-25 ENCOUNTER — OFFICE VISIT (OUTPATIENT)
Age: 25
End: 2025-06-25
Payer: COMMERCIAL

## 2025-06-25 ENCOUNTER — TELEPHONE (OUTPATIENT)
Age: 25
End: 2025-06-25

## 2025-06-25 VITALS
HEIGHT: 69 IN | OXYGEN SATURATION: 97 % | SYSTOLIC BLOOD PRESSURE: 135 MMHG | WEIGHT: 241.7 LBS | BODY MASS INDEX: 35.8 KG/M2 | DIASTOLIC BLOOD PRESSURE: 88 MMHG | HEART RATE: 67 BPM

## 2025-06-25 DIAGNOSIS — E05.00 GRAVES' DISEASE: Primary | ICD-10-CM

## 2025-06-25 PROCEDURE — 80050 GENERAL HEALTH PANEL: CPT | Performed by: INTERNAL MEDICINE

## 2025-06-25 PROCEDURE — 99213 OFFICE O/P EST LOW 20 MIN: CPT | Performed by: INTERNAL MEDICINE

## 2025-06-25 PROCEDURE — 84439 ASSAY OF FREE THYROXINE: CPT | Performed by: INTERNAL MEDICINE

## 2025-06-25 NOTE — TELEPHONE ENCOUNTER
Called and spoke with Karen. Explained we could draw at our office. Also explained that when he is at his appointment if he needs to take his labs somewhere else Dr. Tirado can order and print for him while in the office.     They also were agreeable to coming in earlier. Moved appt to 3:15PM.

## 2025-06-25 NOTE — PROGRESS NOTES
"     Office Note      Date: 2025  Patient Name: Gildardo Eid  MRN: [id number]  : 2000    Chief Complaint   Patient presents with    Graves' Disease       History of Present Illness:   Gildardo Eid is a 25 y.o. male who presents for Graves' Disease  .   Current rx: 20 mg per day     Changes in history: none  Questions /problems:none    Subjective          Review of Systems:   Review of Systems   HENT:  Negative for trouble swallowing.    Cardiovascular:  Negative for palpitations.   Endocrine: Negative for cold intolerance and heat intolerance.   Neurological:  Negative for tremors.       The following portions of the patient's history were reviewed and updated as appropriate: allergies, current medications, past family history, past medical history, past social history, past surgical history, and problem list.    Objective     Visit Vitals  /88 (BP Location: Left arm, Patient Position: Sitting)   Pulse 67   Ht 175.3 cm (69\")   Wt 110 kg (241 lb 11.2 oz)   SpO2 97%   BMI 35.69 kg/m²           Physical Exam:  Physical Exam  Vitals reviewed.   Constitutional:       Appearance: Normal appearance.   Neck:      Comments: Diffuse goiter   Lymphadenopathy:      Cervical: No cervical adenopathy.   Neurological:      Mental Status: He is alert.   Psychiatric:         Mood and Affect: Mood normal.         Behavior: Behavior normal.         Thought Content: Thought content normal.         Judgment: Judgment normal.         Assessment / Plan      Assessment & Plan:  Problem List Items Addressed This Visit       Graves' disease - Primary    Current Assessment & Plan   Stable  Clinically euthyroid. Thyroid levels ordered. Medication to be adjusted accordingly.          Relevant Medications    ibuprofen (ADVIL,MOTRIN) 800 MG tablet    methIMAzole (TAPAZOLE) 10 MG tablet        Electronically signed by : John Tirado MD   2025    "

## 2025-06-25 NOTE — TELEPHONE ENCOUNTER
Caller: odilia montes    Relationship: Emergency Contact    Best call back number: [phone]    What was the call regarding: PT SPOUSE CALLED WANTING TO KNOW IF PT CAN HAVE LABS DONE AT [place] [facility] DUE TO PT LIVING 3 HOURS AWAY AND THIS WOULD BE EASIER FOR PT. PLEASE ADVISE.

## 2025-06-26 LAB
ALBUMIN SERPL-MCNC: 4.5 G/DL (ref 3.5–5.2)
ALBUMIN/GLOB SERPL: 1.6 G/DL
ALP SERPL-CCNC: 77 U/L (ref 39–117)
ALT SERPL W P-5'-P-CCNC: 16 U/L (ref 1–41)
ANION GAP SERPL CALCULATED.3IONS-SCNC: 16.2 MMOL/L (ref 5–15)
AST SERPL-CCNC: 16 U/L (ref 1–40)
BILIRUB SERPL-MCNC: 0.4 MG/DL (ref 0–1.2)
BUN SERPL-MCNC: 15 MG/DL (ref 6–20)
BUN/CREAT SERPL: 14.9 (ref 7–25)
CALCIUM SPEC-SCNC: 9.5 MG/DL (ref 8.6–10.5)
CHLORIDE SERPL-SCNC: 104 MMOL/L (ref 98–107)
CO2 SERPL-SCNC: 21.8 MMOL/L (ref 22–29)
CREAT SERPL-MCNC: 1.01 MG/DL (ref 0.76–1.27)
DEPRECATED RDW RBC AUTO: 37.5 FL (ref 37–54)
EGFRCR SERPLBLD CKD-EPI 2021: 104.5 ML/MIN/1.73
ERYTHROCYTE [DISTWIDTH] IN BLOOD BY AUTOMATED COUNT: 11.7 % (ref 12.3–15.4)
GLOBULIN UR ELPH-MCNC: 2.9 GM/DL
GLUCOSE SERPL-MCNC: 23 MG/DL (ref 65–99)
HCT VFR BLD AUTO: 41.8 % (ref 37.5–51)
HGB BLD-MCNC: 13.7 G/DL (ref 13–17.7)
MCH RBC QN AUTO: 29.1 PG (ref 26.6–33)
MCHC RBC AUTO-ENTMCNC: 32.8 G/DL (ref 31.5–35.7)
MCV RBC AUTO: 88.7 FL (ref 79–97)
PLATELET # BLD AUTO: 272 10*3/MM3 (ref 140–450)
PMV BLD AUTO: 12 FL (ref 6–12)
POTASSIUM SERPL-SCNC: 4.2 MMOL/L (ref 3.5–5.2)
PROT SERPL-MCNC: 7.4 G/DL (ref 6–8.5)
RBC # BLD AUTO: 4.71 10*6/MM3 (ref 4.14–5.8)
SODIUM SERPL-SCNC: 142 MMOL/L (ref 136–145)
T4 FREE SERPL-MCNC: 0.94 NG/DL (ref 0.92–1.68)
TSH SERPL DL<=0.05 MIU/L-ACNC: 3.27 UIU/ML (ref 0.27–4.2)
WBC NRBC COR # BLD AUTO: 7.26 10*3/MM3 (ref 3.4–10.8)

## 2025-06-27 ENCOUNTER — TELEPHONE (OUTPATIENT)
Dept: ENDOCRINOLOGY | Facility: CLINIC | Age: 25
End: 2025-06-27
Payer: COMMERCIAL